# Patient Record
(demographics unavailable — no encounter records)

---

## 2025-02-19 NOTE — CONSULT LETTER
[Dear  ___] : Dear  [unfilled], [Consult Letter:] : I had the pleasure of evaluating your patient, [unfilled]. [Please see my note below.] : Please see my note below. [Consult Closing:] : Thank you very much for allowing me to participate in the care of this patient.  If you have any questions, please do not hesitate to contact me. [Sincerely,] : Sincerely, [FreeTextEntry3] : Gilberto Blake CCC-A Audiology Supervisor  [facility] [address] [place] [address] [phone]

## 2025-02-19 NOTE — HISTORY OF PRESENT ILLNESS
[FreeTextEntry1] : Ricky is a 1 month old male seen for initial diagnostic ABR evaluation following failed  hearing screening at [facility] in the right ear. Family history of hearing loss denied.

## 2025-02-19 NOTE — ASSESSMENT
[FreeTextEntry1] :  ABR is an objective test that measures brainstem activity in response to acoustic stimuli. It evaluates the integrity of the hearing system from the level of the cochlea through the lower brainstem. From this, we are able to gather data to estimate hearing thresholds. Please note thresholds are reported in dBnHL. Diagnostic statement includes a correction factor of -20 dB at 500Hz. -15 dB at 1000Hz, -10 dB at 2000Hz and -5 dB at 4000Hz.   Results of ABR today consistent with hearing within normal limits at 500, 2000 and 4000Hz bilaterally.   Reviewed results of ABR with patient's mother, who expressed understanding.

## 2025-05-06 NOTE — PHYSICAL EXAM
[Well Developed] : well developed [NAD] : in no acute distress [Alert and Active] : alert and active [PERRL] : pupils were equal, round, reactive to light  [Moist & Pink Mucous Membranes] : moist and pink mucous membranes [CTAB] : lungs clear to auscultation bilaterally [Regular Rate and Rhythm] : regular rate and rhythm [Normal S1, S2] : normal S1 and S2 [Soft] : soft  [Normal Bowel Sounds] : normal bowel sounds [No HSM] : no hepatosplenomegaly appreciated [Normal Tone] : normal tone [Well-Perfused] : well-perfused [Interactive] : interactive [Appropriate Behavior] : appropriate behavior [icteric] : anicteric [Respiratory Distress] : no respiratory distress  [Wheeze] : no wheezing  [Murmur] : no murmur [Distended] : non distended [Tender] : non tender [Stool Palpable] : no stool palpable [Mass ___ cm] : no masses were palpated [Edema] : no edema [Cyanosis] : no cyanosis [Rash] : no rash [Jaundice] : no jaundice

## 2025-05-06 NOTE — ASSESSMENT
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  He has no history of blood in the stool or frequent stooling to suggest an allergy and most likely has reflux and feeding aversion.  He has been on multiple formulas most recently Neocate and has recently been having difficulty feeding and would likely benefit from returning to a more standard milk-based formula.  Had a long discussion about reflux and feeding issues.  Recommend: - Can try increasing Pepcid to 0.8 mL twice daily, if not improving on Neocate would try Pepticate.  He does have reflux in the first-line treatment for reflux as this partially hydrolyzed formula.  If not drinking the pepticate would try a more standard formula like gentle ease. - Feed ad jordy. and would try to avoid forced feeding - Follow-up with dermatology - Family instructed to call if any questions/concerns, recommend they set up a follow-up visit in 3 weeks

## 2025-05-06 NOTE — HISTORY OF PRESENT ILLNESS
[de-identified] : This is a 4-month old patient here for further evaluation of feeding problems.  He was born without complications,  Mom recalls the baby passed meconium on the first day of life.   Was breastfeeding well, until ~1.5 months of age, when he started to develop a rash all over his face and part of his body. The nurse giving vaccines  suggested to his mother that the rash could be due to a breast milk allergy, so she switched him to Kendamil formula (with cow's milk), which he ate for ~2 weeks. Gassy and spitting up. The rash continued to worsen, so his mother switched his formula to Similac Alimentum and then Nutramigen.  Would not take either Alimentum or Nutramigen.  He began neocate the first week of March and he was initially drinking it fine for 2 wks and was taking 4 oz per bottles and was doing well. 3rd wk stopped drinking his bottles.  Unclear trigger.  Continued with poor feeding and he was looking dry.  Admitted to [facility] after being transferred from [place] for dehydration.  I reviewed the notes in the EMR.  He was admitted overnight and seen by dermatology.  Derm suggested topical cream for eczema.  He was evaluated by SLP with no signs of aspiration on clinical swallow evaluation though they noted reflux.  At baseline stooling 3-4x per day. Since the neocate he dark green, 1-2x per day, soft,  No hx of excessive stooling. Mom saw some mucous once. He started pepcid before leaving the hospital and since SC  he is eating 1 oz every half hour or so.  Each bottle is 4 oz, mom stops at 30 min as he starts to refuse the bottle and then 30 min later will take a little more.  Takes 1.5 hrs for 3-4oz.  Feeds are a little more than 3 ounces but less than 4. Cut off time 1.5-2 hrs.  Pepcid helped the spit up and has helped decrease his burping.  On0.4 BID.  He has non- bilious and non-bloody emesis. Spitting up after trying to burp. not projectile but shirt is often wet.   Spit up is more clear and less formula.  Weight today slightly less than his hospital admission weight

## 2025-05-06 NOTE — HISTORY OF PRESENT ILLNESS
[de-identified] : This is a 4-month old patient here for further evaluation of feeding problems.  He was born without complications,  Mom recalls the baby passed meconium on the first day of life.   Was breastfeeding well, until ~1.5 months of age, when he started to develop a rash all over his face and part of his body. The nurse giving vaccines  suggested to his mother that the rash could be due to a breast milk allergy, so she switched him to Kendamil formula (with cow's milk), which he ate for ~2 weeks. Gassy and spitting up. The rash continued to worsen, so his mother switched his formula to Similac Alimentum and then Nutramigen.  Would not take either Alimentum or Nutramigen.  He began neocate the first week of March and he was initially drinking it fine for 2 wks and was taking 4 oz per bottles and was doing well. 3rd wk stopped drinking his bottles.  Unclear trigger.  Continued with poor feeding and he was looking dry.  Admitted to [facility] after being transferred from [place] for dehydration.  I reviewed the notes in the EMR.  He was admitted overnight and seen by dermatology.  Derm suggested topical cream for eczema.  He was evaluated by SLP with no signs of aspiration on clinical swallow evaluation though they noted reflux.  At baseline stooling 3-4x per day. Since the neocate he dark green, 1-2x per day, soft,  No hx of excessive stooling. Mom saw some mucous once. He started pepcid before leaving the hospital and since SD  he is eating 1 oz every half hour or so.  Each bottle is 4 oz, mom stops at 30 min as he starts to refuse the bottle and then 30 min later will take a little more.  Takes 1.5 hrs for 3-4oz.  Feeds are a little more than 3 ounces but less than 4. Cut off time 1.5-2 hrs.  Pepcid helped the spit up and has helped decrease his burping.  On0.4 BID.  He has non- bilious and non-bloody emesis. Spitting up after trying to burp. not projectile but shirt is often wet.   Spit up is more clear and less formula.  Weight today slightly less than his hospital admission weight

## 2025-05-06 NOTE — CONSULT LETTER
[Dear  ___] : Dear  [unfilled], [Consult Letter:] : I had the pleasure of evaluating your patient, [unfilled]. [Please see my note below.] : Please see my note below. [Consult Closing:] : Thank you very much for allowing me to participate in the care of this patient.  If you have any questions, please do not hesitate to contact me. [Sincerely,] : Sincerely, [FreeTextEntry3] : Haven Boyd MD Attending Physician Pediatric Gastroenterology and Nutrition

## 2025-05-08 NOTE — DISCUSSION/SUMMARY
[FreeTextEntry1] : 4 month old male, new patient, here to establish care and follow up regarding active issues. Well appearing and playful infant on exam at this time.   -GERD: Continue Pepcid BID as per GI recs -Continue Pepticate formula as per GI recs -Eczema: Continue OTC emollient use. Has Derm appointment in place -Due for 4 month vaccines today: Rotateq, Pentacel, Prevnar -RTC for next [facility] visit/sooner if any concerns arise

## 2025-05-08 NOTE — PHYSICAL EXAM
[General Appearance - Alert] : alert [General Appearance - Well-Appearing] : well appearing [General Appearance - In No Acute Distress] : in no acute distress [Appearance Of Head] : the head was normocephalic [Fontanelles Flat] : the anterior fontanelle was soft and flat [Cranial Sutures] : the skull sutures were normal [Sclera] : the sclera and conjunctiva were normal [Outer Ear] : the ears and nose were normal in appearance [Both Tympanic Membranes Were Examined] : both tympanic membranes were normal [Hearing Threshold Finger Rub Not [place]] : hearing was normal [Nasal Cavity] : the nasal mucosa and septum were normal [Examination Of The Oral Cavity] : the lips and gums were normal [Oropharynx] : the oropharynx was normal [Neck Cervical Mass (___cm)] : no neck mass was observed [Respiration, Rhythm And Depth] : normal respiratory rhythm and effort [Auscultation Breath Sounds / Voice Sounds] : clear bilateral breath sounds [Heart Rate And Rhythm] : heart rate and rhythm were normal [Heart Sounds] : normal S1 and S2 [Murmurs] : no murmurs [Musculoskeletal Exam: Normal Movement Of All Extremities] : normal movements of all extremities [Motor Tone] : muscle strength and tone were normal [No Visual Abnormalities] : no visible abnormailities [Generalized Lymph Node Enlargement] : no lymphadenopathy [Skin Color & Pigmentation] : normal skin color and pigmentation [] : no significant rash [Skin Lesions] : no skin lesions [Penis Abnormality] : the penis was normal [Scrotum] : the scrotum was normal [Testes Cryptorchism] : both testicles were descended [Testes Mass (___cm)] : there were no testicular masses

## 2025-05-08 NOTE — DEVELOPMENTAL MILESTONES
[Social smile] : social smile [Puts hands together] : puts hands together [Grasps object] : grasps object [Turns to voices] : turns to voices [Pulls to sit - no head lag] : pulls to sit - no head lag [Chest up - arm support] : chest up - arm support

## 2025-05-08 NOTE — HISTORY OF PRESENT ILLNESS
[New- Problem] : for a problem-based new visit [Vomiting] : vomiting [Mother] : mother [FreeTextEntry6] : 4 month old male born full term/, uncomplicated nursery course, here as a new patient to establish care and follow up regarding GERD and eczema.   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 Seen by GI on 25, started on Pepticate. Mother reports he is tolerated it well with minimal spit ups. Has soft yellow stools 3-4 times a day on new formula. Also recommended to take Pepcid 0.8ml BID which he is tolerating well.   Mother reports he has also been prescribed 3 different ointments for his eczema, but she cannot recall the names of them at this time. Eczema currently well controlled. Has Derm appointment scheduled.   Also failed NBHS, evaluated by Audiology, normal results.

## 2025-05-08 NOTE — REVIEW OF SYSTEMS
[Nl] : no feeding issues at this time. [Acting Fussy] : not acting fussy [Fever] : no fever [Wgt Loss (___ Lbs)] : no recent weight loss [Failure To Thrive] : no failure to thrive [[place] Bulging] : no bulging fontanelle [Eye Discharge] : no eye discharge [Eye Redness] : no eye redness [Swollen Eyelids] : no swollen eyelids [Ear Tugging] : not tugging at ear(s) [Nasal Discharge] : no nasal discharge [Nasal Stuffiness] : no nasal congestion [Hoarse Cry] : no hoarse cry [Cyanosis] : no cyanosis [Edema] : no edema [Diaphoresis] : not diaphoretic [Tachypnea] : not tachypneic [Wheezing] : no wheezing [Cough] : no cough [Noisy Breathing] : breathing not noisy [Poor Feeder] : not a poor feeder [Vomiting] : no vomiting [Diarrhea] : no diarrhea [Decrease In Appetite] : no decreased appetite [Constipation] : no constipation [Gaseous] : not gaseous [Dec Consciousness] : no decrease in consciousness [Seizure] : no seizures [Hypertonicity] : not hypertonic [Hypotonicity (Flaccid)] : not hypotonic [Refusal to Bear Wgt] : normal weight bearing [Puffy Hands/Feet] : no hand/feet puffiness [Rash] : no rash [Dry Skin] : no dry skin [Jaundice] : no jaundice [Insect Bites] : no insect bites [Bruising] : no tendency for easy bruising [Swollen Glands] : no lymphadenopathy [Penis Circumcised] : had not been circumcised [Undescended Testes] : no undescended testes [Ambiguous Genitals] : genitals not ambiguous [Dec Urine Output] : no oliguria